# Patient Record
Sex: FEMALE | Race: WHITE | NOT HISPANIC OR LATINO | Employment: UNEMPLOYED | URBAN - NONMETROPOLITAN AREA
[De-identification: names, ages, dates, MRNs, and addresses within clinical notes are randomized per-mention and may not be internally consistent; named-entity substitution may affect disease eponyms.]

---

## 2023-10-31 ENCOUNTER — HOSPITAL ENCOUNTER (EMERGENCY)
Facility: HOSPITAL | Age: 63
Discharge: HOME | End: 2023-10-31
Attending: EMERGENCY MEDICINE | Admitting: EMERGENCY MEDICINE
Payer: COMMERCIAL

## 2023-10-31 VITALS
HEIGHT: 70 IN | RESPIRATION RATE: 18 BRPM | OXYGEN SATURATION: 97 % | BODY MASS INDEX: 16.75 KG/M2 | TEMPERATURE: 98 F | DIASTOLIC BLOOD PRESSURE: 73 MMHG | WEIGHT: 117 LBS | SYSTOLIC BLOOD PRESSURE: 130 MMHG | HEART RATE: 59 BPM

## 2023-10-31 DIAGNOSIS — M25.551 BILATERAL HIP PAIN: Primary | ICD-10-CM

## 2023-10-31 DIAGNOSIS — M25.552 BILATERAL HIP PAIN: Primary | ICD-10-CM

## 2023-10-31 PROCEDURE — 99282 EMERGENCY DEPT VISIT SF MDM: CPT

## 2023-10-31 PROCEDURE — 99284 EMERGENCY DEPT VISIT MOD MDM: CPT | Performed by: PHYSICIAN ASSISTANT

## 2023-10-31 NOTE — DISCHARGE INSTRUCTIONS
These rest the legs. We would like you to follow-up with our specialist.  If you are willing to alternate between Tylenol or Motrin. Additionally follow-up with your family doctor.   Please return with any new or worsening symptoms

## 2023-10-31 NOTE — ED PROVIDER NOTES
History  Chief Complaint   Patient presents with    Hip Pain     R hip pain, fall in July; reports R hip worsening w/ pain x October 3    58year-old female presents to the emergency department for evaluation of hip pain, right worse than left. Patient states this originally started on July 14 status post a fall. Reports  she was transferred from fall location to [facility] where she waited for approximately 4 hours prior to being seen and therefore she left. States symptoms seem to be getting better but on October 1st her daughters dog ran into her legs and since symptoms have been worsening. Patient states she was seen at [facility] where she had x-rays were performed that were unremarkable. Also states she was seen at the [facility] where a CAT scan was performed which was negative for acute bony issues. Patient states she [facility] who thought she may need an MRI and instructed her to return to the hospital. She denies back pain. Denies loss of bowel or bladder control. Denies any rectal paresthesias or numbness. Denies any overlying skin abnormalities. She denies any urinary symptoms. Of note patient does not take any pain medications. States since being diagnosed with Lyme disease she has had increased intolerance to many medications. Refuses any type of pain medications including but not limited to Tylenol, Motrin, lidocaine patches. None       History reviewed. No pertinent past medical history. History reviewed. No pertinent surgical history. History reviewed. No pertinent family history. I have reviewed and agree with the history as documented. E-Cigarette/Vaping     E-Cigarette/Vaping Substances     Social History     Tobacco Use    Smoking status: Never    Smokeless tobacco: Never   Substance Use Topics    Alcohol use: Not Currently    Drug use: Not Currently       Review of Systems   Constitutional:  Negative for appetite change, fatigue and fever. Respiratory: Negative. Cardiovascular: Negative. Musculoskeletal:  Positive for arthralgias and gait problem. Negative for back pain, joint swelling, neck pain and neck stiffness. Skin: Negative. All other systems reviewed and are negative. Physical Exam  Physical Exam  Vitals and nursing note reviewed. Constitutional:       General: She is not in acute distress. Appearance: Normal appearance. She is not toxic-appearing or diaphoretic. HENT:      Head: Normocephalic. Nose: Nose normal.   Eyes:      Conjunctiva/sclera: Conjunctivae normal.      Pupils: Pupils are equal, round, and reactive to light. Cardiovascular:      Rate and Rhythm: Normal rate and regular rhythm. Pulmonary:      Effort: Pulmonary effort is normal. No respiratory distress. Breath sounds: Normal breath sounds. No stridor. No wheezing, rhonchi or rales. Chest:      Chest wall: No tenderness. Abdominal:      General: Abdomen is flat. Bowel sounds are normal. There is no distension. Palpations: Abdomen is soft. Tenderness: There is no abdominal tenderness. Musculoskeletal:      Cervical back: Normal and normal range of motion. No deformity, spasms, tenderness or bony tenderness. Normal range of motion. Thoracic back: Normal.      Lumbar back: Normal. No swelling, spasms, tenderness or bony tenderness. Normal range of motion. Comments: No tenderness at the hips. Decreased strength in bilateral lower extremities, but equal.      Skin:     General: Skin is warm and dry. Findings: No bruising, erythema or rash. Neurological:      General: No focal deficit present. Mental Status: She is alert and oriented to person, place, and time.          Vital Signs  ED Triage Vitals [10/31/23 1137]   Temperature Pulse Respirations Blood Pressure SpO2   98 °F (36.7 °C) 59 18 130/73 97 %      Temp Source Heart Rate Source Patient Position - Orthostatic VS BP Location FiO2 (%)   Tympanic Monitor Sitting Right arm --      Pain Score       6           Vitals:    10/31/23 1137   BP: 130/73   Pulse: 59   Patient Position - Orthostatic VS: Sitting         Visual Acuity      ED Medications  Medications - No data to display    Diagnostic Studies  Results Reviewed       None                   No orders to display              Procedures  Procedures         ED Course  ED Course as of 10/31/23 1315   Tue Oct 31, 2023   1227 Reviewed outpatient imaging, x-ray reports no acute osseous injury reported. Discussed with the patient. Was unable to see CT scan results patient showed me the results on her phone, CT was performed at [facility].  There is no evidence of osseous abnormalities on the CT imaging. I discussed with the patient inability to receive MRI today. We discussed re-imagining and pain control which patient refused both. I did recommend outpatient follow-up with specialist which she is agreeable to. Patient refuses all pain medications. SBIRT 22yo+      Flowsheet Row Most Recent Value   Initial Alcohol Screen: US AUDIT-C     1. How often do you have a drink containing alcohol? 0 Filed at: 10/31/2023 1137   2. How many drinks containing alcohol do you have on a typical day you are drinking? 0 Filed at: 10/31/2023 1137   3a. Male UNDER 65: How often do you have five or more drinks on one occasion? 0 Filed at: 10/31/2023 1137   3b. FEMALE Any Age, or MALE 65+: How often do you have 4 or more drinks on one occassion? 0 Filed at: 10/31/2023 1137   Audit-C Score 0 Filed at: 10/31/2023 1137   ERIBERTO: How many times in the past year have you. .. Used an illegal drug or used a prescription medication for non-medical reasons? Never Filed at: 10/31/2023 1137                      Medical Decision Making  41-year-old female presented to the emergency department for evaluation of hip pain. Vitals and medical record reviewed.   Patient risk for the following but not limited to bursitis, fracture, contusion, ligamentous injury. No overlying skin abnormalities. Patient had no tenderness over the bursa, low concern for bursitis. She had recent imaging including x-rays and CTs which were reviewed and negative for osseous injury. Have decreased strength in bilateral lower extremities with normal sensation. Patient did not want any repeat imaging in the emergency department. Did not want any pain medications in the emergency department. Was agreeable to follow-up with specialist. Patient was educated on symptoms that require prompt return to the emergency department for further evaluation and verbalized understanding. Amount and/or Complexity of Data Reviewed  Independent Historian:      Details: son  External Data Reviewed: radiology and notes. Disposition  Final diagnoses:   Bilateral hip pain     Time reflects when diagnosis was documented in both MDM as applicable and the Disposition within this note       Time User Action Codes Description Comment    10/31/2023 12:28 PM Tonie Reyes Add [P87.280,  M25.552] Bilateral hip pain           ED Disposition       ED Disposition   Discharge    Condition   Stable    Date/Time   Tue Oct 31, 2023 [facility] discharge to home/self care. Follow-up Information       Follow up With Specialties Details Why [address] , DO Family Medicine   [facility] [address]  [address] Orthopedic Surgery   [address] [facility]  [address]  [address] [place] [address]  [phone]              There are no discharge medications for this patient.           PDMP Review       None            ED Provider  Electronically Signed by             Tonie Reyes PA-C  10/31/23 7679